# Patient Record
Sex: FEMALE | Race: WHITE | NOT HISPANIC OR LATINO | URBAN - METROPOLITAN AREA
[De-identification: names, ages, dates, MRNs, and addresses within clinical notes are randomized per-mention and may not be internally consistent; named-entity substitution may affect disease eponyms.]

---

## 2021-10-29 ENCOUNTER — OFFICE VISIT (OUTPATIENT)
Dept: URBAN - METROPOLITAN AREA TELEHEALTH 2 | Facility: TELEHEALTH | Age: 76
End: 2021-10-29
Payer: COMMERCIAL

## 2021-10-29 DIAGNOSIS — K59.01 CONSTIPATION: ICD-10-CM

## 2021-10-29 DIAGNOSIS — R19.7 DIARRHEA: ICD-10-CM

## 2021-10-29 DIAGNOSIS — R19.8 ALTERED BOWEL FUNCTION: ICD-10-CM

## 2021-10-29 DIAGNOSIS — R10.84 ABDOMINAL PAIN, GENERALIZED: ICD-10-CM

## 2021-10-29 PROCEDURE — 99244 OFF/OP CNSLTJ NEW/EST MOD 40: CPT | Performed by: INTERNAL MEDICINE

## 2021-10-29 PROCEDURE — 99204 OFFICE O/P NEW MOD 45 MIN: CPT | Performed by: INTERNAL MEDICINE

## 2021-10-29 RX ORDER — SACCHAROMYCES BOULARDII 50 MG
2 CAPSULE CAPSULE ORAL TWICE A DAY
Qty: 120 CAPSULE | Refills: 11 | OUTPATIENT

## 2021-10-29 RX ORDER — HYOSCYAMINE SULFATE 0.12 MG/1
1 TABLET UNDER THE TONGUE AND ALLOW TO DISSOLVE  AS NEEDED TABLET, ORALLY DISINTEGRATING ORAL THREE TIMES A DAY
Qty: 90 | Refills: 11 | OUTPATIENT
Start: 2021-10-29 | End: 2022-10-24

## 2021-10-29 NOTE — HPI-TODAY'S VISIT:
Patient was referred by Dr. Sierra Garcia for an evaluation of abd pain and altered bowel habits.  A copy of this note will be sent to the referring provider  notes postprandial lower abd pain present for 1yr infraumbilical worse w eating achy random and intermittent some relief w charcoal  assoc w altered bowel habits, diarrhea alternating w constipation; also notes bloating   Denies N/V hematochezia melena jaundice unintentional wt loss   Denies dyspepsia htbrn dysphagia odynophagia food impaction CP cough anorexia light headedness   Denies scleral icterus, increased abd girth, LE edema, confusion, disorientation, memory loss, hematemesis  Prior colon 7y ago WNL

## 2021-12-13 ENCOUNTER — OFFICE VISIT (OUTPATIENT)
Dept: URBAN - METROPOLITAN AREA SURGERY CENTER 16 | Facility: SURGERY CENTER | Age: 76
End: 2021-12-13

## 2022-01-26 ENCOUNTER — OFFICE VISIT (OUTPATIENT)
Dept: URBAN - METROPOLITAN AREA SURGERY CENTER 16 | Facility: SURGERY CENTER | Age: 77
End: 2022-01-26
Payer: COMMERCIAL

## 2022-01-26 ENCOUNTER — CLAIMS CREATED FROM THE CLAIM WINDOW (OUTPATIENT)
Dept: URBAN - METROPOLITAN AREA CLINIC 4 | Facility: CLINIC | Age: 77
End: 2022-01-26
Payer: COMMERCIAL

## 2022-01-26 DIAGNOSIS — R19.7 ACUTE DIARRHEA: ICD-10-CM

## 2022-01-26 DIAGNOSIS — K63.89 PNEUMATOSIS OF INTESTINES: ICD-10-CM

## 2022-01-26 PROCEDURE — 88313 SPECIAL STAINS GROUP 2: CPT | Performed by: PATHOLOGY

## 2022-01-26 PROCEDURE — 88305 TISSUE EXAM BY PATHOLOGIST: CPT | Performed by: PATHOLOGY

## 2022-01-26 PROCEDURE — 88342 IMHCHEM/IMCYTCHM 1ST ANTB: CPT | Performed by: PATHOLOGY

## 2022-01-26 PROCEDURE — 45380 COLONOSCOPY AND BIOPSY: CPT | Performed by: INTERNAL MEDICINE

## 2022-01-26 PROCEDURE — G8907 PT DOC NO EVENTS ON DISCHARG: HCPCS | Performed by: INTERNAL MEDICINE

## 2022-01-26 RX ORDER — HYOSCYAMINE SULFATE 0.12 MG/1
1 TABLET UNDER THE TONGUE AND ALLOW TO DISSOLVE  AS NEEDED TABLET, ORALLY DISINTEGRATING ORAL THREE TIMES A DAY
Qty: 90 | Refills: 11 | Status: ACTIVE | COMMUNITY
Start: 2021-10-29 | End: 2022-10-24

## 2022-01-26 RX ORDER — SACCHAROMYCES BOULARDII 50 MG
2 CAPSULE CAPSULE ORAL TWICE A DAY
Qty: 120 CAPSULE | Refills: 11 | Status: ACTIVE | COMMUNITY

## 2022-02-07 PROBLEM — 14760008 CONSTIPATION: Status: ACTIVE | Noted: 2021-10-29

## 2022-02-11 ENCOUNTER — OFFICE VISIT (OUTPATIENT)
Dept: URBAN - METROPOLITAN AREA TELEHEALTH 2 | Facility: TELEHEALTH | Age: 77
End: 2022-02-11
Payer: COMMERCIAL

## 2022-02-11 DIAGNOSIS — K59.01 CONSTIPATION: ICD-10-CM

## 2022-02-11 DIAGNOSIS — R19.8 ALTERED BOWEL FUNCTION: ICD-10-CM

## 2022-02-11 DIAGNOSIS — R10.84 ABDOMINAL CRAMPING, GENERALIZED: ICD-10-CM

## 2022-02-11 DIAGNOSIS — R19.7 DIARRHEA: ICD-10-CM

## 2022-02-11 PROCEDURE — 99213 OFFICE O/P EST LOW 20 MIN: CPT | Performed by: INTERNAL MEDICINE

## 2022-02-11 RX ORDER — SACCHAROMYCES BOULARDII 50 MG
2 CAPSULE CAPSULE ORAL TWICE A DAY
Qty: 120 CAPSULE | Refills: 11 | OUTPATIENT

## 2022-02-11 RX ORDER — HYOSCYAMINE SULFATE 0.12 MG/1
1 TABLET UNDER THE TONGUE AND ALLOW TO DISSOLVE  AS NEEDED TABLET, ORALLY DISINTEGRATING ORAL THREE TIMES A DAY
Qty: 90 | Refills: 11 | OUTPATIENT

## 2022-02-11 RX ORDER — HYOSCYAMINE SULFATE 0.12 MG/1
1 TABLET UNDER THE TONGUE AND ALLOW TO DISSOLVE  AS NEEDED TABLET, ORALLY DISINTEGRATING ORAL THREE TIMES A DAY
Qty: 90 | Refills: 11 | COMMUNITY
Start: 2021-10-29 | End: 2022-10-24

## 2022-02-11 RX ORDER — SACCHAROMYCES BOULARDII 50 MG
2 CAPSULE CAPSULE ORAL TWICE A DAY
Qty: 120 CAPSULE | Refills: 11 | COMMUNITY

## 2022-02-11 NOTE — HPI-TODAY'S VISIT:
wt incr 3# over 3.5mo (was 155)  1/26/22 colonoscopy WNL random bx's low grade dysplasia, flat adenoma vs. contaminant (likely) o/w negative  on Florastor and Levsin,  notes postprandial lower abd pain less freq/severe  mild occas diarrhea alternating w constipation; also notes bloating   Denies N/V hematochezia melena jaundice unintentional wt loss   Denies dyspepsia htbrn dysphagia odynophagia food impaction CP cough anorexia light headedness   Denies scleral icterus, increased abd girth, LE edema, confusion, disorientation, memory loss, hematemesis

## 2022-04-30 ENCOUNTER — TELEPHONE ENCOUNTER (OUTPATIENT)
Dept: URBAN - METROPOLITAN AREA CLINIC 121 | Facility: CLINIC | Age: 77
End: 2022-04-30

## 2022-05-01 ENCOUNTER — TELEPHONE ENCOUNTER (OUTPATIENT)
Dept: URBAN - METROPOLITAN AREA CLINIC 121 | Facility: CLINIC | Age: 77
End: 2022-05-01

## 2022-05-01 RX ORDER — FLUOXETINE HCL 20 MG
CAPSULE ORAL
Status: ACTIVE | COMMUNITY
Start: 2012-01-09

## 2022-09-23 ENCOUNTER — TELEPHONE ENCOUNTER (OUTPATIENT)
Dept: URBAN - METROPOLITAN AREA CLINIC 27 | Facility: CLINIC | Age: 77
End: 2022-09-23

## 2022-11-10 ENCOUNTER — WEB ENCOUNTER (OUTPATIENT)
Dept: URBAN - METROPOLITAN AREA CLINIC 27 | Facility: CLINIC | Age: 77
End: 2022-11-10

## 2022-11-10 ENCOUNTER — DASHBOARD ENCOUNTERS (OUTPATIENT)
Age: 77
End: 2022-11-10

## 2022-11-10 ENCOUNTER — OFFICE VISIT (OUTPATIENT)
Dept: URBAN - METROPOLITAN AREA CLINIC 27 | Facility: CLINIC | Age: 77
End: 2022-11-10
Payer: COMMERCIAL

## 2022-11-10 ENCOUNTER — LAB OUTSIDE AN ENCOUNTER (OUTPATIENT)
Dept: URBAN - METROPOLITAN AREA CLINIC 27 | Facility: CLINIC | Age: 77
End: 2022-11-10

## 2022-11-10 VITALS
DIASTOLIC BLOOD PRESSURE: 74 MMHG | HEART RATE: 58 BPM | WEIGHT: 153 LBS | SYSTOLIC BLOOD PRESSURE: 135 MMHG | HEIGHT: 68 IN | BODY MASS INDEX: 23.19 KG/M2

## 2022-11-10 DIAGNOSIS — K21.9 GASTRO-ESOPHAGEAL REFLUX DISEASE WITHOUT ESOPHAGITIS: ICD-10-CM

## 2022-11-10 DIAGNOSIS — K58.9 IRRITABLE BOWEL DISEASE: ICD-10-CM

## 2022-11-10 PROCEDURE — 99214 OFFICE O/P EST MOD 30 MIN: CPT | Performed by: INTERNAL MEDICINE

## 2022-11-10 RX ORDER — SACCHAROMYCES BOULARDII 50 MG
2 CAPSULE CAPSULE ORAL TWICE A DAY
Qty: 120 CAPSULE | Refills: 11 | Status: ACTIVE | COMMUNITY

## 2022-11-10 RX ORDER — HYOSCYAMINE SULFATE 0.12 MG/1
1 TABLET UNDER THE TONGUE AND ALLOW TO DISSOLVE  AS NEEDED TABLET, ORALLY DISINTEGRATING ORAL THREE TIMES A DAY
Qty: 90 | Refills: 11 | Status: ACTIVE | COMMUNITY

## 2022-11-10 RX ORDER — FLUOXETINE HCL 20 MG
CAPSULE ORAL
Status: ACTIVE | COMMUNITY
Start: 2012-01-09

## 2022-11-10 NOTE — HPI-TODAY'S VISIT:
This is a 77-year-old female seen in consultation today for her alternating diarrhea and constipation.  This is been a chronic issue for her.  She will have bloating with certain foods.  She changed to a vegetarian diet and lost weight but that is stable.  She still has her gallbladder.  She was previously seen by Dr. Valladares and underwent a colonoscopy in August.  He did random colon biopsies for her looser stools.  1 tissue sample revealed adenoma.  It was unclear whether this was a contaminant or identification of an unseen flat adenoma.  In 2014 she underwent upper endoscopy with normal biopsies.  She was being treated for reflux.  That is currently stable.  She is added psyllium husk to her diet and that has helped with her bowel movements and regulation.  Generally she is doing pretty well.  She does not remember if she is ever been tested for celiac.  She does seem to tolerate gluten foods.

## 2022-11-11 PROBLEM — 10743008 IRRITABLE BOWEL SYNDROME: Status: ACTIVE | Noted: 2022-11-11

## 2022-11-11 LAB
GLIADIN (DEAMIDATED): <1
GLIADIN (DEAMIDATED): <1
IMMUNOGLOBULIN A, QN, SERUM: 352
T-TRANSGLUTAMINASE (TTG) IGA: <1

## 2023-05-08 ENCOUNTER — OFFICE VISIT (OUTPATIENT)
Dept: URBAN - METROPOLITAN AREA SURGERY CENTER 7 | Facility: SURGERY CENTER | Age: 78
End: 2023-05-08

## 2023-06-16 ENCOUNTER — OFFICE VISIT (OUTPATIENT)
Dept: URBAN - METROPOLITAN AREA SURGERY CENTER 7 | Facility: SURGERY CENTER | Age: 78
End: 2023-06-16
Payer: COMMERCIAL

## 2023-06-16 ENCOUNTER — CLAIMS CREATED FROM THE CLAIM WINDOW (OUTPATIENT)
Dept: URBAN - METROPOLITAN AREA CLINIC 4 | Facility: CLINIC | Age: 78
End: 2023-06-16
Payer: COMMERCIAL

## 2023-06-16 DIAGNOSIS — K63.89 APPENDICITIS EPIPLOICA: ICD-10-CM

## 2023-06-16 DIAGNOSIS — R19.7 ACUTE DIARRHEA: ICD-10-CM

## 2023-06-16 DIAGNOSIS — K63.89 OTHER SPECIFIED DISEASES OF INTESTINE: ICD-10-CM

## 2023-06-16 PROCEDURE — 88313 SPECIAL STAINS GROUP 2: CPT | Performed by: PATHOLOGY

## 2023-06-16 PROCEDURE — G8907 PT DOC NO EVENTS ON DISCHARG: HCPCS | Performed by: INTERNAL MEDICINE

## 2023-06-16 PROCEDURE — 88342 IMHCHEM/IMCYTCHM 1ST ANTB: CPT | Performed by: PATHOLOGY

## 2023-06-16 PROCEDURE — 88305 TISSUE EXAM BY PATHOLOGIST: CPT | Performed by: PATHOLOGY

## 2023-06-16 PROCEDURE — 45380 COLONOSCOPY AND BIOPSY: CPT | Performed by: INTERNAL MEDICINE

## 2023-06-16 RX ORDER — SACCHAROMYCES BOULARDII 50 MG
2 CAPSULE CAPSULE ORAL TWICE A DAY
Qty: 120 CAPSULE | Refills: 11 | Status: ACTIVE | COMMUNITY

## 2023-06-16 RX ORDER — FLUOXETINE HCL 20 MG
CAPSULE ORAL
Status: ACTIVE | COMMUNITY
Start: 2012-01-09

## 2023-06-16 RX ORDER — HYOSCYAMINE SULFATE 0.12 MG/1
1 TABLET UNDER THE TONGUE AND ALLOW TO DISSOLVE  AS NEEDED TABLET, ORALLY DISINTEGRATING ORAL THREE TIMES A DAY
Qty: 90 | Refills: 11 | Status: ACTIVE | COMMUNITY